# Patient Record
Sex: MALE | Race: WHITE | NOT HISPANIC OR LATINO | URBAN - METROPOLITAN AREA
[De-identification: names, ages, dates, MRNs, and addresses within clinical notes are randomized per-mention and may not be internally consistent; named-entity substitution may affect disease eponyms.]

---

## 2020-06-27 ENCOUNTER — COMMUNICATION - HEALTHEAST (OUTPATIENT)
Dept: SCHEDULING | Facility: CLINIC | Age: 70
End: 2020-06-27

## 2021-05-25 ENCOUNTER — RECORDS - HEALTHEAST (OUTPATIENT)
Dept: ADMINISTRATIVE | Facility: CLINIC | Age: 71
End: 2021-05-25

## 2021-05-26 ENCOUNTER — RECORDS - HEALTHEAST (OUTPATIENT)
Dept: ADMINISTRATIVE | Facility: CLINIC | Age: 71
End: 2021-05-26

## 2021-05-27 ENCOUNTER — RECORDS - HEALTHEAST (OUTPATIENT)
Dept: ADMINISTRATIVE | Facility: CLINIC | Age: 71
End: 2021-05-27

## 2021-05-30 ENCOUNTER — RECORDS - HEALTHEAST (OUTPATIENT)
Dept: ADMINISTRATIVE | Facility: CLINIC | Age: 71
End: 2021-05-30

## 2021-05-31 ENCOUNTER — RECORDS - HEALTHEAST (OUTPATIENT)
Dept: ADMINISTRATIVE | Facility: CLINIC | Age: 71
End: 2021-05-31

## 2021-06-02 ENCOUNTER — RECORDS - HEALTHEAST (OUTPATIENT)
Dept: ADMINISTRATIVE | Facility: CLINIC | Age: 71
End: 2021-06-02

## 2021-06-09 NOTE — TELEPHONE ENCOUNTER
Pt is calling in about a tick bite on his belt line above the right buttocks from June 1 that has still not healed. On June 4th it became reddened and swollen. Pt is a  and [facility] and was given 2 pills of Doxycycline on 6/12/2020. Pt reports he is not sure how long it was on him, or if it was a deer tick, but he did get the head out, and it was not completely flat so he thinks it may have been on him for some time. Pt reports he has had not fever or headache, but he did have a red ring around the bite shortly after he took the antibiotics. Pt also notes there was an open hole for quite some time, but no drainage. Pt reports it is still red and itchy. Pt is not an ealth [place] patient.   Care advice given, and per protocol pt should be evaluated within 24 hours. Pt agrees with plan, and will go to the [facility],. Advised pt to call back if he has further questions.     Robert Momin RN [facility] Triage/Medication Refill    Reason for Disposition    Red ring or bull's-eye rash occurs at tick bite    Protocols used: TICK BITE-A-